# Patient Record
Sex: FEMALE | Race: WHITE | NOT HISPANIC OR LATINO | URBAN - NONMETROPOLITAN AREA
[De-identification: names, ages, dates, MRNs, and addresses within clinical notes are randomized per-mention and may not be internally consistent; named-entity substitution may affect disease eponyms.]

---

## 2018-07-13 ENCOUNTER — OUTSIDE FACILITY SERVICE (OUTPATIENT)
Dept: CARDIOLOGY | Facility: CLINIC | Age: 53
End: 2018-07-13

## 2018-07-13 PROCEDURE — 93306 TTE W/DOPPLER COMPLETE: CPT | Performed by: INTERNAL MEDICINE

## 2019-03-06 ENCOUNTER — OUTSIDE FACILITY SERVICE (OUTPATIENT)
Dept: CARDIOLOGY | Facility: CLINIC | Age: 54
End: 2019-03-06

## 2019-03-06 PROCEDURE — 93010 ELECTROCARDIOGRAM REPORT: CPT | Performed by: INTERNAL MEDICINE

## 2020-12-10 ENCOUNTER — OFFICE VISIT (OUTPATIENT)
Dept: OTOLARYNGOLOGY | Age: 55
End: 2020-12-10
Payer: MEDICARE

## 2020-12-10 VITALS — HEIGHT: 65 IN | BODY MASS INDEX: 23.14 KG/M2 | WEIGHT: 138.89 LBS

## 2020-12-10 PROCEDURE — 99203 OFFICE O/P NEW LOW 30 MIN: CPT | Performed by: OTOLARYNGOLOGY

## 2020-12-10 PROCEDURE — G8420 CALC BMI NORM PARAMETERS: HCPCS | Performed by: OTOLARYNGOLOGY

## 2020-12-10 PROCEDURE — G8484 FLU IMMUNIZE NO ADMIN: HCPCS | Performed by: OTOLARYNGOLOGY

## 2020-12-10 PROCEDURE — G8427 DOCREV CUR MEDS BY ELIG CLIN: HCPCS | Performed by: OTOLARYNGOLOGY

## 2020-12-10 RX ORDER — RISPERIDONE 2 MG/1
2 TABLET, FILM COATED ORAL 2 TIMES DAILY
COMMUNITY

## 2020-12-10 RX ORDER — VENLAFAXINE HYDROCHLORIDE 150 MG/1
CAPSULE, EXTENDED RELEASE ORAL
COMMUNITY

## 2020-12-10 RX ORDER — MORPHINE SULFATE 20 MG/ML
SOLUTION ORAL
COMMUNITY

## 2020-12-10 RX ORDER — OMEPRAZOLE 40 MG/1
CAPSULE, DELAYED RELEASE ORAL
COMMUNITY

## 2020-12-10 RX ORDER — ALPRAZOLAM 1 MG/1
1 TABLET ORAL NIGHTLY PRN
COMMUNITY

## 2020-12-10 RX ORDER — KETOPROFEN 50 MG/1
50 CAPSULE ORAL 4 TIMES DAILY PRN
COMMUNITY

## 2020-12-10 RX ORDER — MORPHINE SULFATE 30 MG/1
TABLET ORAL
COMMUNITY

## 2020-12-10 RX ORDER — ZOLPIDEM TARTRATE 10 MG/1
TABLET ORAL
COMMUNITY

## 2020-12-10 RX ORDER — CLINDAMYCIN HYDROCHLORIDE 300 MG/1
CAPSULE ORAL
COMMUNITY

## 2020-12-10 RX ORDER — DEXAMETHASONE 4 MG/1
TABLET ORAL
COMMUNITY

## 2020-12-10 RX ORDER — GABAPENTIN 300 MG/1
CAPSULE ORAL
COMMUNITY

## 2020-12-10 RX ORDER — SOLIFENACIN SUCCINATE 10 MG/1
TABLET, FILM COATED ORAL
COMMUNITY

## 2020-12-10 RX ORDER — METFORMIN HYDROCHLORIDE 500 MG/1
TABLET, EXTENDED RELEASE ORAL
COMMUNITY

## 2020-12-10 NOTE — ASSESSMENT & PLAN NOTE
Leftward deviation of nasal septum. Possibly posttraumatic but impossible to determine.   No evidence of hematoma

## 2020-12-10 NOTE — ASSESSMENT & PLAN NOTE
Obvious external deformity secondary to recent fall. Obvious fracture nasal bones. Appears to have had sutures placed at nasal dorsum. Discussed pros and cons of repair and she does wish to proceed with closed reduction understanding that the cosmetic result may be compromised by this technique  Given her underlying diagnosis and the fact she does not appear to be particularly robust I advised against the procedure which would require a general anesthetic. We will make arrange for her surgical risk to be assessed by anesthesia prior to the procedure.   I did let her know that it was possible that anesthesia would advise against the procedure given the nature of her diagnosis and underlying medical problems

## 2020-12-10 NOTE — PROGRESS NOTES
62 y.o.  female presents today with nasal fracture. On December 4 she fell striking her nose on a curb resulting in nasal fracture. She was seen and evaluated in the [place] emergency room and referred to [facility] for further evaluation. She is having some symptoms of nasal obstruction secondary to the injury. She is understandably concerned about her parents. She does carry a diagnosis of breast cancer which is currently end-stage and she is in hospice care. History reviewed. No pertinent family history. Social History     Socioeconomic History    Marital status: Unknown     Spouse name: None    Number of children: None    Years of education: None    Highest education level: None   Occupational History    None   Social Needs    Financial resource strain: None    Food insecurity     Worry: None     Inability: None    Transportation needs     Medical: None     Non-medical: None   Tobacco Use    Smoking status: Never Smoker    Smokeless tobacco: Never Used   Substance and Sexual Activity    Alcohol use:  Yes    Drug use: Never    Sexual activity: None   Lifestyle    Physical activity     Days per week: None     Minutes per session: None    Stress: None   Relationships    Social connections     Talks on phone: None     Gets together: None     Attends Rastafarian service: None     Active member of club or organization: None     Attends meetings of clubs or organizations: None     Relationship status: None    Intimate partner violence     Fear of current or ex partner: None     Emotionally abused: None     Physically abused: None     Forced sexual activity: None   Other Topics Concern    None   Social History Narrative    None     Past Medical History:   Diagnosis Date    Breast cancer, stage 4 ([address]     Closed displaced fracture of nasal bone      Past Surgical History:   Procedure Laterality Date    ADENOIDECTOMY      BLADDER SURGERY      BREAST RECONSTRUCTION Bilateral     closed reduction understanding that the cosmetic result may be compromised by this technique  Given her underlying diagnosis and the fact she does not appear to be particularly robust I advised against the procedure which would require a general anesthetic. We will make arrange for her surgical risk to be assessed by anesthesia prior to the procedure. I did let her know that it was possible that anesthesia would advise against the procedure given the nature of her diagnosis and underlying medical problems         Relevant Medications    morphine (MSIR) 30 MG tablet    Morphine Sulfate (MORPHINE 20MG/ML) SOLN concentrated solution    ketoprofen (ORUDIS) 50 MG capsule    Other Relevant Orders    [facility] CLOSED RX NOSE FX W STABILIZATN    [place] CLOSED RX NASAL SEPTAL FRACTURE          Orders Placed This Encounter   Procedures    [place] CLOSED RX NOSE FX W STABILIZATN     Nature of surgery along with risks and benefits discussed with patient. After giving it some thought she did wish to go ahead with reduction     Standing Status:   Future     Standing Expiration Date:   1/10/2021    [place] CLOSED RX NASAL SEPTAL FRACTURE     We will attempt to reduce septal deformity while under general anesthetic for close reduction of nasal fracture. Standing Status:   Future     Standing Expiration Date:   1/10/2021       No orders of the defined types were placed in this encounter. Please note that this chart was generated using dragon dictation software. Although every effort was made to ensure the accuracy of this automated transcription, some errors in transcription may have occurred.

## 2020-12-11 ENCOUNTER — TELEPHONE (OUTPATIENT)
Dept: OTOLARYNGOLOGY | Age: 55
End: 2020-12-11

## 2020-12-11 NOTE — TELEPHONE ENCOUNTER
Called Mrs. Iveth Duran to let her know that we have her scheduled for surgery on Tuesday 12/15/20. I informed her that she will need to come have her covid test done tomorrow 12/12/20 for surgery. Surgery information was given to her and all her questions were answered.

## 2020-12-14 ENCOUNTER — TELEPHONE (OUTPATIENT)
Dept: OTOLARYNGOLOGY | Age: 55
End: 2020-12-14

## 2020-12-14 NOTE — TELEPHONE ENCOUNTER
I noticed Mrs. Lux Forrester did not get her covid test done on Saturday. I called and spoke with Mr. Lux Forrester and he stated she has decided to not go through with the surgery so that is why they did not come to get the covid test. Case has been cancelled.